# Patient Record
Sex: FEMALE | Race: OTHER | HISPANIC OR LATINO | Employment: FULL TIME | URBAN - METROPOLITAN AREA
[De-identification: names, ages, dates, MRNs, and addresses within clinical notes are randomized per-mention and may not be internally consistent; named-entity substitution may affect disease eponyms.]

---

## 2022-01-05 ENCOUNTER — LAB VISIT (OUTPATIENT)
Dept: PRIMARY CARE CLINIC | Facility: CLINIC | Age: 37
End: 2022-01-05
Payer: OTHER GOVERNMENT

## 2022-01-05 DIAGNOSIS — Z20.822 CONTACT WITH AND (SUSPECTED) EXPOSURE TO COVID-19: ICD-10-CM

## 2022-01-05 LAB
CTP QC/QA: YES
SARS-COV-2 AG RESP QL IA.RAPID: NEGATIVE

## 2022-01-05 PROCEDURE — 87811 SARS-COV-2 COVID19 W/OPTIC: CPT

## 2022-11-01 ENCOUNTER — HOSPITAL ENCOUNTER (EMERGENCY)
Facility: HOSPITAL | Age: 37
Discharge: HOME | End: 2022-11-01
Attending: EMERGENCY MEDICINE

## 2022-11-01 VITALS
OXYGEN SATURATION: 100 % | BODY MASS INDEX: 26.8 KG/M2 | HEART RATE: 86 BPM | DIASTOLIC BLOOD PRESSURE: 78 MMHG | WEIGHT: 157 LBS | HEIGHT: 64 IN | SYSTOLIC BLOOD PRESSURE: 126 MMHG | RESPIRATION RATE: 18 BRPM | TEMPERATURE: 98 F

## 2022-11-01 DIAGNOSIS — S61.412A LACERATION OF LEFT HAND WITHOUT FOREIGN BODY, INITIAL ENCOUNTER: Primary | ICD-10-CM

## 2022-11-01 LAB
B-HCG UR QL: NEGATIVE
CTP QC/QA: YES

## 2022-11-01 PROCEDURE — 25000003 PHARM REV CODE 250: Performed by: PHYSICIAN ASSISTANT

## 2022-11-01 PROCEDURE — 99283 EMERGENCY DEPT VISIT LOW MDM: CPT | Mod: 25

## 2022-11-01 PROCEDURE — 12002 RPR S/N/AX/GEN/TRNK2.6-7.5CM: CPT

## 2022-11-01 PROCEDURE — 81025 URINE PREGNANCY TEST: CPT | Performed by: EMERGENCY MEDICINE

## 2022-11-01 RX ORDER — LIDOCAINE HYDROCHLORIDE 10 MG/ML
10 INJECTION, SOLUTION EPIDURAL; INFILTRATION; INTRACAUDAL; PERINEURAL
Status: COMPLETED | OUTPATIENT
Start: 2022-11-01 | End: 2022-11-01

## 2022-11-01 RX ORDER — CEPHALEXIN 500 MG/1
500 CAPSULE ORAL EVERY 8 HOURS
Qty: 21 CAPSULE | Refills: 0 | Status: SHIPPED | OUTPATIENT
Start: 2022-11-01 | End: 2022-11-08

## 2022-11-01 RX ADMIN — BACITRACIN ZINC, NEOMYCIN, POLYMYXIN B 1 EACH: 400; 3.5; 5 OINTMENT TOPICAL at 06:11

## 2022-11-01 RX ADMIN — LIDOCAINE HYDROCHLORIDE 100 MG: 10 INJECTION, SOLUTION EPIDURAL; INFILTRATION; INTRACAUDAL; PERINEURAL at 06:11

## 2022-11-01 NOTE — DISCHARGE INSTRUCTIONS

## 2022-11-01 NOTE — ED TRIAGE NOTES
Pt reports to the ED with lac to left hand. Pt states she was cutting chicken when the knife slipped. Bleeding controlled.         Patient identifiers verified and correct.     APPEARANCE: Patient not in acute distress.  NEURO: Awake, alert, appropriate for age, condition, and situation, pupils equal, round, and reactive.   HEENT: Head symmetrical. Eyes bilateral.  Bilateral ears without drainage. Bilateral nares patent, throat clear.  CARDIAC: Regular rate and rhythm  RESPIRATORY: Airway is open and patent. Respirations are normal and spontaneous on room air.   NEUROVASCULAR: All extremities are warm and pink. .  MUSCULOSKELETAL: Moves all extremities.   SKIN: Warm and dry, adequate turgor, mucus membranes moist and pink; lac noted to left hand    Will continue to monitor.

## 2022-11-01 NOTE — ED PROVIDER NOTES
Encounter Date: 11/1/2022       History     Chief Complaint   Patient presents with    Laceration     Pt accidentally cut left hand with knife while cutting chicken, tetanus updated last year  bleeding controled, +2 radial pulse      37 year old female presents to ED with concern laceration to left hand obtained just prior to arrival while attempting to cut chicken.  Bleeding quickly controlled pressure.  Pain is sharp, worse with touch, nonradiating, severity 6/10.  Denies numbness or focal weakness.  Tetanus up-to-date.  No other acute complaints at this time.    The history is provided by the patient. The history is limited by a language barrier. A  was used (Language line ).   Review of patient's allergies indicates:  No Known Allergies  No past medical history on file.  No past surgical history on file.  No family history on file.  Social History     Tobacco Use    Smoking status: Never   Substance Use Topics    Alcohol use: No    Drug use: No     Review of Systems   Skin:  Positive for wound.   Neurological:  Negative for weakness and numbness.     Physical Exam     Initial Vitals [11/01/22 1534]   BP Pulse Resp Temp SpO2   126/78 86 18 97.9 °F (36.6 °C) 100 %      MAP       --         Physical Exam    Nursing note and vitals reviewed.  Constitutional: She appears well-developed and well-nourished. She is active. She does not have a sickly appearance. She does not appear ill. No distress.   HENT:   Head: Normocephalic and atraumatic.   Neck:   Normal range of motion.  Musculoskeletal:        Hands:       Cervical back: Normal range of motion.      Comments: 3 cm linear laceration to dorsal left hand near proximal thumb.  No deep tissue or tendon involvement.  Full ROM of left thumb with sensations intact and brisk capillary refill.     Neurological: She is alert. GCS eye subscore is 4. GCS verbal subscore is 5. GCS motor subscore is 6.   Skin: Skin is warm and dry.   Psychiatric:  She has a normal mood and affect. Her speech is normal and behavior is normal.       ED Course   Lac Repair    Date/Time: 11/1/2022 6:22 PM  Performed by: Yusef Black PA-C  Authorized by: Yusef Black PA-C     Anesthesia:     Anesthesia method:  Local infiltration    Local anesthetic:  Lidocaine 1% w/o epi  Laceration details:     Location:  Hand    Hand location:  L hand, dorsum    Length (cm):  3  Exploration:     Hemostasis achieved with:  Direct pressure  Treatment:     Area cleansed with:  Saline and chlorhexidine    Irrigation method:  Syringe  Skin repair:     Repair method:  Sutures    Suture size:  4-0    Wound skin closure material used: Ethilon.    Suture technique:  Simple interrupted    Number of sutures:  6  Approximation:     Approximation:  Close  Repair type:     Repair type:  Simple  Post-procedure details:     Dressing:  Sterile dressing and antibiotic ointment    Procedure completion:  Tolerated well, no immediate complications  Labs Reviewed   POCT URINE PREGNANCY          Imaging Results    None          Medications   LIDOcaine (PF) 10 mg/ml (1%) injection 100 mg (has no administration in time range)   neomycin-bacitracnZn-polymyxnB packet (has no administration in time range)     Medical Decision Making:   Initial Assessment:   Patient presents with concern of 3 cm linear laceration to dorsal left hand obtained by kitchen knife while attempting to cut chicken.  No numbness or focal weakness.  Bleeding controlled on exam.  No deep tissue involvement.  Neurovascular intact.  Differential Diagnosis:   Laceration  ED Management:  Laceration site was thoroughly cleaned then primary closed with sutures.  Patient tolerated well.  Tetanus is up-to-date.  Sterile dressings applied.  Prescription for Keflex.  Encouraged to keep area clean and dry, monitor wound healing closely, close PCP/ED follow-up for wound recheck and suture removal.  ED return precautions were discussed at length.  Patient  states her understanding and agrees with plan.                        Clinical Impression:   Final diagnoses:  [S61.412A] Laceration of left hand without foreign body, initial encounter (Primary)      ED Disposition Condition    Discharge Stable          ED Prescriptions       Medication Sig Dispense Start Date End Date Auth. Provider    cephALEXin (KEFLEX) 500 MG capsule Take 1 capsule (500 mg total) by mouth every 8 (eight) hours. for 7 days 21 capsule 11/1/2022 11/8/2022 Yusef Black PA-C          Follow-up Information       Follow up With Specialties Details Why Contact Info    Felix Baxter, MD Internal Medicine   [address] [place] [address] [address] Mian [place]FIN [id number] [phone]      [place] - Emergency Dept Emergency Medicine In 2 weeks For suture removal, For wound re-check [address] [facility] [id number] [phone]             Yusef Black PA-C  11/01/22 6353